# Patient Record
Sex: MALE | Race: BLACK OR AFRICAN AMERICAN | NOT HISPANIC OR LATINO | Employment: UNEMPLOYED | URBAN - METROPOLITAN AREA
[De-identification: names, ages, dates, MRNs, and addresses within clinical notes are randomized per-mention and may not be internally consistent; named-entity substitution may affect disease eponyms.]

---

## 2019-08-29 ENCOUNTER — HOSPITAL ENCOUNTER (OUTPATIENT)
Dept: RADIOLOGY | Facility: HOSPITAL | Age: 29
Discharge: HOME | End: 2019-08-29
Attending: PHYSICAL MEDICINE & REHABILITATION
Payer: MEDICAID

## 2019-08-29 DIAGNOSIS — M54.50 LOW BACK PAIN: ICD-10-CM

## 2019-08-29 DIAGNOSIS — M54.50 LOW BACK PAIN: Primary | ICD-10-CM

## 2019-08-29 PROCEDURE — 72100 X-RAY EXAM L-S SPINE 2/3 VWS: CPT | Mod: TC,FY,PO

## 2019-08-29 PROCEDURE — 73502 X-RAY EXAM HIP UNI 2-3 VIEWS: CPT | Mod: TC,FY,PO,RT

## 2019-10-04 DIAGNOSIS — M70.61 TROCHANTERIC BURSITIS OF RIGHT HIP: ICD-10-CM

## 2019-10-04 DIAGNOSIS — M53.3 SACROCOCCYGEAL DISORDERS, NOT ELSEWHERE CLASSIFIED: ICD-10-CM

## 2019-10-04 DIAGNOSIS — M16.51 UNILATERAL POST-TRAUMATIC OSTEOARTHRITIS, RIGHT HIP: Primary | ICD-10-CM

## 2019-10-15 ENCOUNTER — CLINICAL SUPPORT (OUTPATIENT)
Dept: REHABILITATION | Facility: HOSPITAL | Age: 29
End: 2019-10-15
Attending: PHYSICAL MEDICINE & REHABILITATION
Payer: MEDICAID

## 2019-10-15 DIAGNOSIS — G89.29 CHRONIC RIGHT-SIDED LOW BACK PAIN WITHOUT SCIATICA: ICD-10-CM

## 2019-10-15 DIAGNOSIS — M25.551 PAIN IN RIGHT HIP: ICD-10-CM

## 2019-10-15 DIAGNOSIS — M54.50 CHRONIC RIGHT-SIDED LOW BACK PAIN WITHOUT SCIATICA: ICD-10-CM

## 2019-10-15 DIAGNOSIS — M25.661 DECREASED RANGE OF MOTION OF RIGHT LOWER EXTREMITY: ICD-10-CM

## 2019-10-15 PROBLEM — M25.669 DECREASED ROM OF LOWER EXTREMITY: Status: ACTIVE | Noted: 2019-10-15

## 2019-10-15 PROCEDURE — 97161 PT EVAL LOW COMPLEX 20 MIN: CPT | Mod: PO
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
## 2019-11-20 ENCOUNTER — CLINICAL SUPPORT (OUTPATIENT)
Dept: REHABILITATION | Facility: HOSPITAL | Age: 29
End: 2019-11-20
Attending: PHYSICAL MEDICINE & REHABILITATION
Payer: MEDICAID

## 2019-11-20 ENCOUNTER — DOCUMENTATION ONLY (OUTPATIENT)
Dept: REHABILITATION | Facility: HOSPITAL | Age: 29
End: 2019-11-20

## 2019-11-20 DIAGNOSIS — G89.29 CHRONIC RIGHT-SIDED LOW BACK PAIN WITHOUT SCIATICA: ICD-10-CM

## 2019-11-20 DIAGNOSIS — M54.50 CHRONIC RIGHT-SIDED LOW BACK PAIN WITHOUT SCIATICA: ICD-10-CM

## 2019-11-20 DIAGNOSIS — M25.551 PAIN IN RIGHT HIP: ICD-10-CM

## 2019-11-20 DIAGNOSIS — M25.661 DECREASED RANGE OF MOTION OF RIGHT LOWER EXTREMITY: ICD-10-CM

## 2019-11-20 PROCEDURE — 97110 THERAPEUTIC EXERCISES: CPT | Mod: PO

## 2019-11-20 NOTE — PROGRESS NOTES
Face to Face PTA Conference performed with Drew Leo, CAITLIN regarding patient's current status, overall progress, and plan of care    Face to Face PTA Conference performed with Erwin Neil PT regarding patient's current status, overall progress, and plan of care    Drew Leo  11/20/2019

## 2019-11-20 NOTE — PROGRESS NOTES
"  Physical Therapy Daily Treatment Note     Name: Edinson [facility] Number: [id number]    Therapy Diagnosis:   Encounter Diagnoses   Name Primary?    Chronic right-sided low back pain without sciatica     Pain in right hip     Decreased range of motion of right lower extremity      Physician: Heron Reid MD    Visit Date: 11/20/2019    Physician Orders: PT Eval and Treat   Medical Diagnosis from Referral: M16.51 (ICD-10-CM) - Unilateral post-traumatic osteoarthritis, right hip M53.3 (ICD-10-CM) - Sacrococcygeal disorders, not elsewhere classified M70.61 (ICD-10-CM) - Trochanteric bursitis of right hip   Evaluation Date: 10/15/2019  Authorization Period Expiration: 10/31/2019   Plan of Care Expiration: 12/15/2019  Visit # / Visits authorized: 1/ 16    Time In: 10:00am  Time Out: 10:55am  Total Billable Time: 55 minutes    Precautions: Standard    Subjective     Pt reports: pain along anterior aspect of right hip.  He was not compliant with home exercise program.  Response to previous treatment: decreased hip pain  Functional change: improved mobility during ADL's    Pain: 1/10  Location: right upper legs     Objective     Edinson received therapeutic exercises to develop strength, endurance, ROM, flexibility and core stabilization for 55 minutes including:    Date  11/20/19   VISIT 1/16       POC EXP. DATE 12/15/19   FACE-TO-FACE 12/20/19   FOTO 2/5       TABLE:    Hamstring stretch 10 x 10"   Piriformis stretch 5 x 10"   Hip flexor/quad stretch 2 x 30" w/strap   SKTC 5 x 10"   LTR 1 x 10   TA 10 x 5"   bridging 1 x 15   SLR 1 x 10   Hip abduction 1 x 15 GTB   Hip adduction 15 x 3" w/ball           SEATED:    LAQ 2 x 10 x 1#   Hamstring curl 2 x 10 GTB   Hip flexion 1 x 15 x 1#           STANDING:    SLS 2 x 30"   Step ups L1 2 x 10   Mini-squats 2 x 10   Resisted side stepping 8 x 10ft RTB   Hip ext 2 x 10 RTB   Hip abd 2 x 10 RTB       Initials MA         Home Exercises Provided and Patient Education " Provided     Education provided:   - perform stretching in comfortable range    Written Home Exercises Provided: Patient instructed to cont prior HEP.  Exercises were reviewed and Edinson was able to demonstrate them prior to the end of the session.  Edinson demonstrated good  understanding of the education provided.     See EMR under Patient Instructions for exercises provided prior visit.    Assessment     Edinson returns to therapy with complaints of decreased pain on this date.  He is progressing towards goals with addition of therapeutic exercises and will continue with exercise protocol with progression next visit.  Pt continues to have limited ROM of right hip and decreased core strength.  He was educated on importance of HEP to augment the clinical treatment plan.  Edinson is progressing well towards his goals.   Pt prognosis is Good.     Pt will continue to benefit from skilled outpatient physical therapy to address the deficits listed in the problem list box on initial evaluation, provide pt/family education and to maximize pt's level of independence in the home and community environment.     Pt's spiritual, cultural and educational needs considered and pt agreeable to plan of care and goals.     Anticipated barriers to physical therapy: none    Goals:   Short Term Goals: 4 weeks  1. Patient will be compliant with HEP to promote the independent management of current diagnosis. In Progress  2. Patient will increase lumbar forward bending to reach lower shin level for function of dressing. In Progress  3. Patient will improve right hip abduction to 10 degrees. In Progress  4. Patient will report a decrease in complaints of right hip pain to 5/10 during ADLs. In Progress        Long Term Goals:  8 weeks  1. Patient will improve core stabilization strength to Good to improve tolerance to normal house work activities for self care independence. In Progress  2. Patient will increase lumbar forward bending to  reach ankle level for function of dressing. In Progress  3. Patient will report a decrease in complaints of right hip pain to 3/10 during ADLs. In Progress  4. Patient will improve FOTO limitation status from 52% to 43% placing the patient in the 40-60% impaired, limited, or restricted category indicating increased functional mobility. In Progress    Plan     Progress table exercises next visit.     Erwin Neli, PT

## 2019-11-27 ENCOUNTER — CLINICAL SUPPORT (OUTPATIENT)
Dept: REHABILITATION | Facility: HOSPITAL | Age: 29
End: 2019-11-27
Attending: PHYSICAL MEDICINE & REHABILITATION
Payer: MEDICAID

## 2019-11-27 DIAGNOSIS — G89.29 CHRONIC RIGHT-SIDED LOW BACK PAIN WITHOUT SCIATICA: ICD-10-CM

## 2019-11-27 DIAGNOSIS — M25.661 DECREASED RANGE OF MOTION OF RIGHT LOWER EXTREMITY: ICD-10-CM

## 2019-11-27 DIAGNOSIS — M25.551 PAIN IN RIGHT HIP: ICD-10-CM

## 2019-11-27 DIAGNOSIS — M54.50 CHRONIC RIGHT-SIDED LOW BACK PAIN WITHOUT SCIATICA: ICD-10-CM

## 2019-11-27 PROCEDURE — 97110 THERAPEUTIC EXERCISES: CPT | Mod: PO

## 2019-11-27 NOTE — PROGRESS NOTES
"  Physical Therapy Daily Treatment Note     Name: Edinson [facility] Number: [id number]    Therapy Diagnosis:   Encounter Diagnoses   Name Primary?    Chronic right-sided low back pain without sciatica     Pain in right hip     Decreased range of motion of right lower extremity      Physician: Heron Reid MD    Visit Date: 11/27/2019    Physician Orders: PT Eval and Treat   Medical Diagnosis from Referral: M16.51 (ICD-10-CM) - Unilateral post-traumatic osteoarthritis, right hip M53.3 (ICD-10-CM) - Sacrococcygeal disorders, not elsewhere classified M70.61 (ICD-10-CM) - Trochanteric bursitis of right hip   Evaluation Date: 10/15/2019  Authorization Period Expiration: 10/31/2019   Plan of Care Expiration: 12/15/2019  Visit # / Visits authorized: 2/ 16    Time In: 10:00 am  Time Out: 10:50 am  Total Billable Time: 40 minutes    Precautions: Standard    Subjective     Pt reports: his right leg is bothering him today   He was not compliant with home exercise program.  Response to previous treatment: decreased hip pain  Functional change: improved mobility during ADL's    Pain: 6/10  Location: right upper legs     Objective     Edinson received therapeutic exercises to develop strength, endurance, ROM, flexibility and core stabilization for 38 minutes including:    Date  11/27/19 11/20/19   VISIT 2/16 1/16        POC EXP. DATE 12/15/19 12/15/19   FACE-TO-FACE 12/20/19 12/20/19   FOTO 3/5 2/5        TABLE:     Hamstring stretch 10 x 10" 10 x 10"   Piriformis stretch 5 x 10" 5 x 10"   Hip flexor/quad stretch NT 2 x 30" w/strap   SKTC 5 x 10" 5 x 10"   LTR 1 x 10  1 x 10   TA 10 x 5" 10 x 5"   bridging 1 x 15 1 x 15   SLR 1 x 15  1 x 10   Hip abduction 1 x 15 GTB 1 x 15 GTB   Hip adduction 15 x 3" w/ball  15 x 3" w/ball             SEATED:     LAQ 2 x 10 x 1#  2 x 10 x 1#   Hamstring curl 2 x 10 GTB 2 x 10 GTB   Hip flexion 2 x 10 x 1# 1 x 15 x 1#             STANDING:     SLS 2 x 30" 2 x 30"   Step ups L1 2 x 10 "  L1 2 x 10   Mini-squats 2 x 10  2 x 10   Resisted side stepping NT 8 x 10ft RTB   Hip ext NT 2 x 10 RTB   Hip abd 2 x 10 RTB 2 x 10 RTB        Initials SANJEEV KIRKPATRICK         Home Exercises Provided and Patient Education Provided     Education provided:   - HEP compliance     Written Home Exercises Provided: Patient instructed to cont prior HEP.  Exercises were reviewed and Edinson was able to demonstrate them prior to the end of the session.  Edinson demonstrated good  understanding of the education provided.     See EMR under Patient Instructions for exercises provided prior visit.    Assessment     Edinson returns to therapy with complaints of increased pain on his right side.  He is progressing towards goals with addition of therapeutic exercises and will continue with exercise protocol with progression next visit. Patient required increased verbal cueing to focus and complete the task at hand.     Edinson is progressing well towards his goals.   Pt prognosis is Good.     Pt will continue to benefit from skilled outpatient physical therapy to address the deficits listed in the problem list box on initial evaluation, provide pt/family education and to maximize pt's level of independence in the home and community environment.     Pt's spiritual, cultural and educational needs considered and pt agreeable to plan of care and goals.     Anticipated barriers to physical therapy: none    Goals:   Short Term Goals: 4 weeks  1. Patient will be compliant with HEP to promote the independent management of current diagnosis. In Progress  2. Patient will increase lumbar forward bending to reach lower shin level for function of dressing. In Progress  3. Patient will improve right hip abduction to 10 degrees. In Progress  4. Patient will report a decrease in complaints of right hip pain to 5/10 during ADLs. In Progress        Long Term Goals:  8 weeks  1. Patient will improve core stabilization strength to Good to improve tolerance to  normal house work activities for self care independence. In Progress  2. Patient will increase lumbar forward bending to reach ankle level for function of dressing. In Progress  3. Patient will report a decrease in complaints of right hip pain to 3/10 during ADLs. In Progress  4. Patient will improve FOTO limitation status from 52% to 43% placing the patient in the 40-60% impaired, limited, or restricted category indicating increased functional mobility. In Progress    Plan     Progress table exercises next visit.     Gloria Bellamy, PT, DPT

## 2019-11-29 ENCOUNTER — CLINICAL SUPPORT (OUTPATIENT)
Dept: REHABILITATION | Facility: HOSPITAL | Age: 29
End: 2019-11-29
Attending: PHYSICAL MEDICINE & REHABILITATION
Payer: MEDICAID

## 2019-11-29 DIAGNOSIS — M25.661 DECREASED RANGE OF MOTION OF RIGHT LOWER EXTREMITY: ICD-10-CM

## 2019-11-29 DIAGNOSIS — M25.551 PAIN IN RIGHT HIP: ICD-10-CM

## 2019-11-29 DIAGNOSIS — M54.50 CHRONIC RIGHT-SIDED LOW BACK PAIN WITHOUT SCIATICA: ICD-10-CM

## 2019-11-29 DIAGNOSIS — G89.29 CHRONIC RIGHT-SIDED LOW BACK PAIN WITHOUT SCIATICA: ICD-10-CM

## 2019-11-29 PROCEDURE — 97110 THERAPEUTIC EXERCISES: CPT | Mod: PO

## 2019-11-29 NOTE — PROGRESS NOTES
"  Physical Therapy Daily Treatment Note     Name: Edinson [facility] Number: [id number]    Therapy Diagnosis:   Encounter Diagnoses   Name Primary?    Chronic right-sided low back pain without sciatica     Pain in right hip     Decreased range of motion of right lower extremity      Physician: Heron Reid MD    Visit Date: 11/29/2019    Physician Orders: PT Eval and Treat   Medical Diagnosis from Referral: M16.51 (ICD-10-CM) - Unilateral post-traumatic osteoarthritis, right hip M53.3 (ICD-10-CM) - Sacrococcygeal disorders, not elsewhere classified M70.61 (ICD-10-CM) - Trochanteric bursitis of right hip   Evaluation Date: 10/15/2019  Authorization Period Expiration: 10/31/2019   Plan of Care Expiration: 12/15/2019  Visit # / Visits authorized: 3/ 16    Time In: 10:20 am  Time Out: 11:00 am  Total Billable Time: 40 minutes    Precautions: Standard    Subjective     Pt reports: having decreased tightness in hip and leg since beginning therapy.  He was not compliant with home exercise program.  Response to previous treatment: decreased hip pain  Functional change: improved mobility during ADL's    Pain: 6/10  Location: right upper legs     Objective     Edinson received therapeutic exercises to develop strength, endurance, ROM, flexibility and core stabilization for 38 minutes including:    Date  11/29/19 11/27/19 11/20/19   VISIT 3/16 2/16 1/16         POC EXP. DATE 12/15/19 12/15/19 12/15/19   FACE-TO-FACE 12/20/19 12/20/19 12/20/19   FOTO 4/5 3/5 2/5         TABLE:      Hamstring stretch 10 x 10" 10 x 10" 10 x 10"   Piriformis stretch 5 x 10" 5 x 10" 5 x 10"   Hip flexor/quad stretch 3 x 20" w/strap NT 2 x 30" w/strap   SKTC 5 x 10" 5 x 10" 5 x 10"   LTR 1 x 10 1 x 10  1 x 10   TA -- 10 x 5" 10 x 5"   bridging 2 x 10 1 x 15 1 x 15   SLR 1 x 15 1 x 15  1 x 10   Hip abduction 2 x 10 GTB 1 x 15 GTB 1 x 15 GTB   Hip adduction 20 x 3" w/ball 15 x 3" w/ball  15 x 3" w/ball               SEATED:      LAQ 2 x 10 " "x 2# 2 x 10 x 1#  2 x 10 x 1#   Hamstring curl 2 x 10 GTB 2 x 10 GTB 2 x 10 GTB   Hip flexion 2 x 10 x 2# 2 x 10 x 1# 1 x 15 x 1#               STANDING:      SLS -- 2 x 30" 2 x 30"   Step ups -- L1 2 x 10  L1 2 x 10   Mini-squats -- 2 x 10  2 x 10   Resisted side stepping -- NT 8 x 10ft RTB   Hip ext -- NT 2 x 10 RTB   Hip abd -- 2 x 10 RTB 2 x 10 RTB         Initials MA SANJEEV KIRKPATRICK         Home Exercises Provided and Patient Education Provided     Education provided:   - HEP compliance     Written Home Exercises Provided: Patient instructed to cont prior HEP.  Exercises were reviewed and Edinson was able to demonstrate them prior to the end of the session.  Edinson demonstrated good  understanding of the education provided.     See EMR under Patient Instructions for exercises provided prior visit.    Assessment     Edinson continues to require constant supervision and cues to perform exercises and keep track of repetitions.  He continues to have tightness and weakness in bilateral LE's and will continue with current plan of care.      Edinson is progressing well towards his goals.   Pt prognosis is Good.     Pt will continue to benefit from skilled outpatient physical therapy to address the deficits listed in the problem list box on initial evaluation, provide pt/family education and to maximize pt's level of independence in the home and community environment.     Pt's spiritual, cultural and educational needs considered and pt agreeable to plan of care and goals.     Anticipated barriers to physical therapy: none    Goals:   Short Term Goals: 4 weeks  1. Patient will be compliant with HEP to promote the independent management of current diagnosis. In Progress  2. Patient will increase lumbar forward bending to reach lower shin level for function of dressing. In Progress  3. Patient will improve right hip abduction to 10 degrees. In Progress  4. Patient will report a decrease in complaints of right hip pain to 5/10 " during ADLs. In Progress        Long Term Goals:  8 weeks  1. Patient will improve core stabilization strength to Good to improve tolerance to normal house work activities for self care independence. In Progress  2. Patient will increase lumbar forward bending to reach ankle level for function of dressing. In Progress  3. Patient will report a decrease in complaints of right hip pain to 3/10 during ADLs. In Progress  4. Patient will improve FOTO limitation status from 52% to 43% placing the patient in the 40-60% impaired, limited, or restricted category indicating increased functional mobility. In Progress    Plan     Progress table exercises next visit.     Erwin Neil, PT, DPT

## 2019-12-12 ENCOUNTER — CLINICAL SUPPORT (OUTPATIENT)
Dept: REHABILITATION | Facility: HOSPITAL | Age: 29
End: 2019-12-12
Attending: PHYSICAL MEDICINE & REHABILITATION
Payer: MEDICAID

## 2019-12-12 ENCOUNTER — DOCUMENTATION ONLY (OUTPATIENT)
Dept: REHABILITATION | Facility: HOSPITAL | Age: 29
End: 2019-12-12

## 2019-12-12 DIAGNOSIS — M25.551 PAIN IN RIGHT HIP: ICD-10-CM

## 2019-12-12 DIAGNOSIS — M25.661 DECREASED RANGE OF MOTION OF RIGHT LOWER EXTREMITY: ICD-10-CM

## 2019-12-12 DIAGNOSIS — M54.50 CHRONIC RIGHT-SIDED LOW BACK PAIN WITHOUT SCIATICA: ICD-10-CM

## 2019-12-12 DIAGNOSIS — G89.29 CHRONIC RIGHT-SIDED LOW BACK PAIN WITHOUT SCIATICA: ICD-10-CM

## 2019-12-12 PROCEDURE — 97110 THERAPEUTIC EXERCISES: CPT | Mod: PO

## 2019-12-12 NOTE — PROGRESS NOTES
"  Physical Therapy Daily Treatment Note     Name: Edinson [facility] Number: [id number]    Therapy Diagnosis:   Encounter Diagnoses   Name Primary?    Chronic right-sided low back pain without sciatica     Pain in right hip     Decreased range of motion of right lower extremity      Physician: Heron Reid MD    Visit Date: 12/12/2019    Physician Orders: PT Eval and Treat   Medical Diagnosis from Referral: M16.51 (ICD-10-CM) - Unilateral post-traumatic osteoarthritis, right hip M53.3 (ICD-10-CM) - Sacrococcygeal disorders, not elsewhere classified M70.61 (ICD-10-CM) - Trochanteric bursitis of right hip   Evaluation Date: 10/15/2019  Authorization Period Expiration: 10/31/2019   Plan of Care Expiration: 12/15/2019  Visit # / Visits authorized: 4/ 16    Time In: 10:00 am  Time Out: 11:00 am  Total Billable Time: 40 minutes    Precautions: Standard    Subjective     Pt reports: decreased pain recently and has improved tolerance to walking.  He was not compliant with home exercise program.  Response to previous treatment: decreased hip pain  Functional change: improved mobility during ADL's    Pain: 1/10  Location: right upper legs     Objective     Edinson received therapeutic exercises to develop strength, endurance, ROM, flexibility and core stabilization for 38 minutes including:    Date  12/12/19 11/29/19 11/27/19 11/20/19   VISIT 4/16 3/16 2/16 1/16          POC EXP. DATE 1/12/20 12/15/19 12/15/19 12/15/19   FACE-TO-FACE 1/12/20 12/20/19 12/20/19 12/20/19   FOTO 5/5 4/5 3/5 2/5          TABLE:       Hamstring stretch 10 x 10" 10 x 10" 10 x 10" 10 x 10"   Piriformis stretch 5 x 10" 5 x 10" 5 x 10" 5 x 10"   Hip flexor/quad stretch DC 3 x 20" w/strap NT 2 x 30" w/strap   SKTC 5 x 10" 5 x 10" 5 x 10" 5 x 10"   LTR 1 x 10- 1 x 10 1 x 10  1 x 10   TA with march 1 x 15 -- 10 x 5" 10 x 5"   bridging 3 x 10 2 x 10 1 x 15 1 x 15   SLR 2 x 10 x 1# 1 x 15 1 x 15  1 x 10   Hip abduction 2 x 10 x 1# SL 2 x 10 GTB " "1 x 15 GTB 1 x 15 GTB   Hip adduction 30 x 3" w/ball 20 x 3" w/ball 15 x 3" w/ball  15 x 3" w/ball   Hip extension 1 x 10 x 1#             SEATED:       LAQ 3 x 10 x 3# 2 x 10 x 2# 2 x 10 x 1#  2 x 10 x 1#   Hamstring curl 3 x 10 BTB 2 x 10 GTB 2 x 10 GTB 2 x 10 GTB   Hip flexion 3 x 10 x 3# 2 x 10 x 2# 2 x 10 x 1# 1 x 15 x 1#                 STANDING:       SLS 2 x 30" -- 2 x 30" 2 x 30"   Step ups L1 2 x 10 -- L1 2 x 10  L1 2 x 10   Mini-squats 2 x 10 -- 2 x 10  2 x 10   Resisted side stepping -- -- NT 8 x 10ft RTB   Hip ext -- -- NT 2 x 10 RTB   Hip abd -- -- 2 x 10 RTB 2 x 10 RTB          Initials MA MA BJ MA      Functional limitation reporting disability percentage was obtained through use of FOTO Hip Survey indicating 43% disability     Lumbar Range of Motion:     Degrees Pain   Flexion Reaches to ankle    no         Extension Not tested    Not tested         Left Side Bending Reaches to knee no         Right Side Bending Reaches to knee no            Right Hip AROM/PROM as follows:  Flexion: 115/125 degrees  Abduction: 8/10 degrees  ER 25/35 degrees     Lower Extremity Strength  Right LE   Left LE     Knee extension: 4+/5 Knee extension: 4+/5   Knee flexion: 4+/5 Knee flexion: 4+/5   Hip flexion: 4/5 Hip flexion: 4+/5   Hip extension:  3+/5 Hip extension: 4/5   Hip abduction: 4-/5 Hip abduction: 4-/5   Hip adduction: 4-/5 Hip adduction 4/5          Home Exercises Provided and Patient Education Provided     Education provided:   - HEP compliance     Written Home Exercises Provided: Patient instructed to cont prior HEP.  Exercises were reviewed and Edinson was able to demonstrate them prior to the end of the session.  Edinson demonstrated good  understanding of the education provided.     See EMR under Patient Instructions for exercises provided prior visit.    Assessment     Edinson is noted to have improved hip ROM, improved LROM, and improved strength as compared to evaluation.  He is progressing towards " goals and would benefit from continued therapy in order to continued progression of strengthening and achieve long term goals.     Edinson is progressing well towards his goals.   Pt prognosis is Good.     Pt will continue to benefit from skilled outpatient physical therapy to address the deficits listed in the problem list box on initial evaluation, provide pt/family education and to maximize pt's level of independence in the home and community environment.     Pt's spiritual, cultural and educational needs considered and pt agreeable to plan of care and goals.     Anticipated barriers to physical therapy: none    Goals:   Short Term Goals: 4 weeks  1. Patient will be compliant with HEP to promote the independent management of current diagnosis. In Progress  2. Patient will increase lumbar forward bending to reach lower shin level for function of dressing. MET  3. Patient will improve right hip abduction to 10 degrees. In Progress  4. Patient will report a decrease in complaints of right hip pain to 5/10 during ADLs. MET        Long Term Goals:  8 weeks  1. Patient will improve core stabilization strength to Good to improve tolerance to normal house work activities for self care independence. In Progress  2. Patient will increase lumbar forward bending to reach ankle level for function of dressing. MET  3. Patient will report a decrease in complaints of right hip pain to 3/10 during ADLs. In Progress  4. Patient will improve FOTO limitation status from 52% to 43% placing the patient in the 40-60% impaired, limited, or restricted category indicating increased functional mobility. MET    Plan     Continue with plan of care and progress LE strengthening.    Erwin Neil, PT, DPT

## 2019-12-12 NOTE — PLAN OF CARE
Outpatient Therapy Updated Plan of Care     Visit Date: 12/12/2019  [id number]    Therapy Diagnosis:   Encounter Diagnoses   Name Primary?    Chronic right-sided low back pain without sciatica     Pain in right hip     Decreased range of motion of right lower extremity      Physician: Heron Reid MD    Physician Orders: PT Eval and Treat   Medical Diagnosis: M16.51 (ICD-10-CM) - Unilateral post-traumatic osteoarthritis, right hip M53.3 (ICD-10-CM) - Sacrococcygeal disorders, not elsewhere classified M70.61 (ICD-10-CM) - Trochanteric bursitis of right hip   Evaluation Date: 10/15/2019    Total Visits Received: 5  Cancelled Visits: 3  No Show Visits: 3    Current Certification Period:  10/15/19 to 12/15/19  Precautions:  standard  Visits from Evaluation Date:  4  Functional Level Prior to Evaluation:  independent    Subjective     Update: Pt reports having decreased pain, improved mobility, and strength since beginning therapy.  He has improved tolerance to prolonged walking.    Objective     Update:    Functional limitation reporting disability percentage was obtained through use of FOTO Hip Survey indicating 43% disability      Lumbar Range of Motion:     Degrees Pain   Flexion Reaches to ankle    no         Extension Not tested    Not tested         Left Side Bending Reaches to knee no         Right Side Bending Reaches to knee no            Right Hip AROM/PROM as follows:  Flexion: 115/125 degrees  Abduction: 8/10 degrees  ER 25/35 degrees     Lower Extremity Strength  Right LE   Left LE     Knee extension: 4+/5 Knee extension: 4+/5   Knee flexion: 4+/5 Knee flexion: 4+/5   Hip flexion: 4/5 Hip flexion: 4+/5   Hip extension:  3+/5 Hip extension: 4/5   Hip abduction: 4-/5 Hip abduction: 4-/5   Hip adduction: 4-/5 Hip adduction 4/5          Assessment     Update: Edinson is noted to have improved hip ROM, improved LROM, and improved strength as compared to evaluation.   He is progressing towards goals and would benefit from continued therapy in order to continued progression of strengthening and achieve long term goals.    Previous Short Term Goals Status:     Short Term Goals: 4 weeks  1. Patient will be compliant with HEP to promote the independent management of current diagnosis. In Progress  2. Patient will increase lumbar forward bending to reach lower shin level for function of dressing. MET  3. Patient will improve right hip abduction to 10 degrees. In Progress  4. Patient will report a decrease in complaints of right hip pain to 5/10 during ADLs. MET        Long Term Goals:  8 weeks  1. Patient will improve core stabilization strength to Good to improve tolerance to normal house work activities for self care independence. In Progress  2. Patient will increase lumbar forward bending to reach ankle level for function of dressing. MET  3. Patient will report a decrease in complaints of right hip pain to 3/10 during ADLs. In Progress  4. Patient will improve FOTO limitation status from 52% to 43% placing the patient in the 40-60% impaired, limited, or restricted category indicating increased functional mobility. MET    Long Term Goal Status:   continue per initial plan of care.  Reasons for Recertification of Therapy:   Pt has missed a few visits due to transportation issues and his plan of care is coming to an end.  He has been progressing towards goals and would benefit from continued therapy in order to achieve LTG.     Plan     Updated Certification Period: 12/12/2019 to 01/12/2020  Recommended Treatment Plan: 2 times per week for 4 weeks: Manual Therapy, Moist Heat/ Ice, Neuromuscular Re-ed, Patient Education, Therapeutic Activites and Therapeutic Exercise  Other Recommendations: none    Erwin Neil, PT  12/12/2019      I CERTIFY THE NEED FOR THESE SERVICES FURNISHED UNDER THIS PLAN OF TREATMENT AND WHILE UNDER MY CARE    Physician's comments:        Physician's  Signature: ___________________________________________________

## 2019-12-12 NOTE — PROGRESS NOTES
Face to Face PTA Conference performed with Drew Leo, CAITLIN regarding patient's current status, overall progress, and plan of care    Face to Face PTA Conference performed with Erwin Neil PT regarding patient's current status, overall progress, and plan of care    Drew Leo  12/12/2019

## 2020-01-21 PROBLEM — M25.669 DECREASED ROM OF LOWER EXTREMITY: Status: RESOLVED | Noted: 2019-10-15 | Resolved: 2020-01-21

## 2020-01-21 PROBLEM — M54.50 CHRONIC RIGHT-SIDED LOW BACK PAIN WITHOUT SCIATICA: Status: RESOLVED | Noted: 2019-10-15 | Resolved: 2020-01-21

## 2020-01-21 PROBLEM — M25.551 PAIN IN RIGHT HIP: Status: RESOLVED | Noted: 2019-10-15 | Resolved: 2020-01-21

## 2020-01-21 PROBLEM — G89.29 CHRONIC RIGHT-SIDED LOW BACK PAIN WITHOUT SCIATICA: Status: RESOLVED | Noted: 2019-10-15 | Resolved: 2020-01-21

## 2020-09-01 ENCOUNTER — HOSPITAL ENCOUNTER (EMERGENCY)
Facility: HOSPITAL | Age: 30
Discharge: HOME | End: 2020-09-01
Attending: EMERGENCY MEDICINE
Payer: MEDICAID

## 2020-09-01 VITALS
WEIGHT: 155 LBS | RESPIRATION RATE: 16 BRPM | DIASTOLIC BLOOD PRESSURE: 74 MMHG | SYSTOLIC BLOOD PRESSURE: 121 MMHG | HEIGHT: 70 IN | TEMPERATURE: 98 F | HEART RATE: 84 BPM | BODY MASS INDEX: 22.19 KG/M2 | OXYGEN SATURATION: 99 %

## 2020-09-01 DIAGNOSIS — F10.929 ALCOHOLIC INTOXICATION WITH COMPLICATION: ICD-10-CM

## 2020-09-01 DIAGNOSIS — F19.10 DRUG ABUSE: Primary | ICD-10-CM

## 2020-09-01 LAB
ALBUMIN SERPL BCP-MCNC: 4.5 G/DL (ref 3.5–5.2)
ALP SERPL-CCNC: 64 U/L (ref 38–126)
ALT SERPL W/O P-5'-P-CCNC: 9 U/L (ref 10–44)
AMPHET+METHAMPHET UR QL: NORMAL
ANION GAP SERPL CALC-SCNC: 9 MMOL/L (ref 8–16)
AST SERPL-CCNC: 31 U/L (ref 15–46)
BARBITURATES UR QL SCN>200 NG/ML: NEGATIVE
BASOPHILS # BLD AUTO: 0.08 K/UL (ref 0–0.2)
BASOPHILS NFR BLD: 1.6 % (ref 0–1.9)
BENZODIAZ UR QL SCN>200 NG/ML: NORMAL
BILIRUB SERPL-MCNC: 0.5 MG/DL (ref 0.1–1)
BUN SERPL-MCNC: 7 MG/DL (ref 2–20)
BZE UR QL SCN: NEGATIVE
CALCIUM SERPL-MCNC: 8.7 MG/DL (ref 8.7–10.5)
CANNABINOIDS UR QL SCN: NORMAL
CHLORIDE SERPL-SCNC: 102 MMOL/L (ref 95–110)
CO2 SERPL-SCNC: 29 MMOL/L (ref 23–29)
CREAT SERPL-MCNC: 0.64 MG/DL (ref 0.5–1.4)
CREAT UR-MCNC: >346.5 MG/DL (ref 23–375)
DIFFERENTIAL METHOD: ABNORMAL
EOSINOPHIL # BLD AUTO: 0.1 K/UL (ref 0–0.5)
EOSINOPHIL NFR BLD: 2 % (ref 0–8)
ERYTHROCYTE [DISTWIDTH] IN BLOOD BY AUTOMATED COUNT: 13.7 % (ref 11.5–14.5)
EST. GFR  (AFRICAN AMERICAN): >60 ML/MIN/1.73 M^2
EST. GFR  (NON AFRICAN AMERICAN): >60 ML/MIN/1.73 M^2
ETHANOL SERPL-MCNC: 41 MG/DL
GLUCOSE SERPL-MCNC: 84 MG/DL (ref 70–110)
HCT VFR BLD AUTO: 40.8 % (ref 40–54)
HGB BLD-MCNC: 13.4 G/DL (ref 14–18)
IMM GRANULOCYTES # BLD AUTO: 0.01 K/UL (ref 0–0.04)
IMM GRANULOCYTES NFR BLD AUTO: 0.2 % (ref 0–0.5)
LYMPHOCYTES # BLD AUTO: 1.6 K/UL (ref 1–4.8)
LYMPHOCYTES NFR BLD: 32.8 % (ref 18–48)
MCH RBC QN AUTO: 30.2 PG (ref 27–31)
MCHC RBC AUTO-ENTMCNC: 32.8 G/DL (ref 32–36)
MCV RBC AUTO: 92 FL (ref 82–98)
METHADONE UR QL SCN>300 NG/ML: NEGATIVE
MONOCYTES # BLD AUTO: 0.5 K/UL (ref 0.3–1)
MONOCYTES NFR BLD: 10.4 % (ref 4–15)
NEUTROPHILS # BLD AUTO: 2.6 K/UL (ref 1.8–7.7)
NEUTROPHILS NFR BLD: 53 % (ref 38–73)
NRBC BLD-RTO: 0 /100 WBC
OPIATES UR QL SCN: NEGATIVE
PCP UR QL SCN>25 NG/ML: NEGATIVE
PLATELET # BLD AUTO: 240 K/UL (ref 150–350)
PMV BLD AUTO: 10.6 FL (ref 9.2–12.9)
POTASSIUM SERPL-SCNC: 3.4 MMOL/L (ref 3.5–5.1)
PROT SERPL-MCNC: 7.4 G/DL (ref 6–8.4)
RBC # BLD AUTO: 4.43 M/UL (ref 4.6–6.2)
SODIUM SERPL-SCNC: 140 MMOL/L (ref 136–145)
TOXICOLOGY INFORMATION: NORMAL
WBC # BLD AUTO: 4.91 K/UL (ref 3.9–12.7)

## 2020-09-01 PROCEDURE — 80307 DRUG TEST PRSMV CHEM ANLYZR: CPT | Mod: ER

## 2020-09-01 PROCEDURE — 96360 HYDRATION IV INFUSION INIT: CPT | Mod: ER

## 2020-09-01 PROCEDURE — 85025 COMPLETE CBC W/AUTO DIFF WBC: CPT | Mod: ER

## 2020-09-01 PROCEDURE — 25000003 PHARM REV CODE 250: Mod: ER | Performed by: EMERGENCY MEDICINE

## 2020-09-01 PROCEDURE — 80320 DRUG SCREEN QUANTALCOHOLS: CPT | Mod: ER

## 2020-09-01 PROCEDURE — 80053 COMPREHEN METABOLIC PANEL: CPT | Mod: ER

## 2020-09-01 PROCEDURE — 99284 EMERGENCY DEPT VISIT MOD MDM: CPT | Mod: 25,ER

## 2020-09-01 RX ADMIN — SODIUM CHLORIDE 1000 ML: 0.9 INJECTION, SOLUTION INTRAVENOUS at 12:09

## 2020-09-01 NOTE — DISCHARGE INSTRUCTIONS
Please avoid all illicit drugs as well as any medicine not prescribed to you.  Avoid alcohol.  Please call alcoholics anonymous.  Please return to the ED immediately if symptoms return, change or worsen in any way.  Please call your primary doctor today for reevaluation appointment.

## 2020-09-01 NOTE — ED NOTES
Pt placed into gown.  Bed placed in lowest position with side rails up x 2. Call light is within reach of pt . Explanation of care provided to pt. Alarms set on monitor for heart rate, pulse ox, Blood Pressure. Plan of Care explained to pt, will continue to observe, monitor, and keep patient informed.     LOC: The patient is awake, alert and aware of environment with an appropriate affect, the patient is oriented x 3 and speaking appropriately.     Psych: Patient is calm and cooperative with good eye contact.    APPEARANCE: Patient is clean and non toxic appearing    NEUROLOGIC:  JERRI, Follows commands without difficulty. Speech is clear. No neuro deficits observed.    HEENT: Denies HEENT complaint or injury, moist mucus membranes     RESPIRATORY: Airway is open and patent, respirations are spontaneous; patient has a normal effort and rate. Bilateral breath sounds are clear. Pink nailbeds.     CARDIAC: Patient has a normal rate and rhythm, no peripheral edema noted, capillary refill < 2 seconds. PULSES are symmetrical in all extremities    GI/ : Soft and non tender to palpation, no distention noted.     MUSCULOSKELETAL:  Normal range of motion noted. Moves all extremities well, No swelling, deformity or tenderness noted. Pain to right hip, chronic     SKIN: The skin is warm, dry and intact. Patient has normal skin turgor and moist mucus membranes, no rashes or lesions. No Breakdown noted.

## 2020-09-01 NOTE — ED PROVIDER NOTES
Chief Complaint  Chief Complaint   Patient presents with    Fatigue     Brought in by EMS, patient was found in someones yard, EMS states patient was c/o right hip pain x a few days, admits to smoking mojo and ETOH        HPI  Edinson Hampton is a 30 y.o. male who presents with sleeping in someone's yd.  EMS reports they found him sleeping in someone's yd.  He admitted to smoking Mojo and drinking alcohol.  Patient complains also of right hip pain, initially he reports for a few days, but later reports that this is been ongoing for a long period of time.  He denies any new trauma or different character of pain in his usual right sided pain.  He reports that he is homeless and has no home at this time.  He asks for food and drink    Past medical history  Past Medical History:   Diagnosis Date    Trauma        Current Medications  No current facility-administered medications for this encounter.   No current outpatient medications on file.    Allergies  Review of patient's allergies indicates:  No Known Allergies    Surgical history  History reviewed. No pertinent surgical history.    Social history  Social History     Socioeconomic History    Marital status: Single     Spouse name: Not on file    Number of children: Not on file    Years of education: Not on file    Highest education level: Not on file   Occupational History    Not on file   Social Needs    Financial resource strain: Not on file    Food insecurity     Worry: Not on file     Inability: Not on file    Transportation needs     Medical: Not on file     Non-medical: Not on file   Tobacco Use    Smoking status: Never Smoker   Substance and Sexual Activity    Alcohol use: Yes    Drug use: Yes     Comment: mojo    Sexual activity: Not on file   Lifestyle    Physical activity     Days per week: Not on file     Minutes per session: Not on file    Stress: Not on file   Relationships    Social connections     Talks on phone: Not on file     Gets  "together: Not on file     Attends [employer] service: Not on file     Active member of club or organization: Not on file     Attends meetings of clubs or organizations: Not on file     Relationship status: Not on file   Other Topics Concern    Not on file   Social History Narrative    Not on file       Family History  History reviewed. No pertinent family history.    Review of systems  : No dysuria or discharge.  Musculoskeletal: No injury; full range of motion.  Skin: No rash, abscess, or laceration.  Neurologic: No new focal weakness or sensory changes.  All systems otherwise negative except as noted in ROS and HPI    Physical Exam  Vital signs: /74   Pulse 84   Temp 98.4 °F (36.9 °C)   Resp 16   Ht 5' 10" (1.778 m)   Wt 70.3 kg (155 lb)   SpO2 99%   BMI 22.24 kg/m²   Constitutional: No acute distress.  Well developed, alert, oriented and appropriate.  HENT: Normocephalic, atraumatic. Normal ear, nose, and throat.  Eyes:  Right eye strabismus  Neck: Normal range of motion, no tenderness; supple.  Respiratory: Nonlabored breathing with normal breath sounds.  Cardiovascular: RRR with no pulse deficit.  GI: Soft, nontender, no rebound or guarding.  Musculoskeletal: Normal ROM, no tenderness, injury, or edema.  Skin: Warm, dry skin without infection or injury.  Neurologic: Normal motor, sensation with no new focal deficit.  Drowsy intoxicated speech initially.  Walks with antalgic gait  Psychiatric: Affect normal, judgement normal, mood normal.  No SI, HI, and not gravely disabled.    Labs  Pertinent labs reviewed (see chart for details)  Labs Reviewed   ALCOHOL,MEDICAL (ETHANOL) - Abnormal; Notable for the following components:       Result Value    Alcohol, Medical, Serum 41 (*)     All other components within normal limits   CBC W/ AUTO DIFFERENTIAL - Abnormal; Notable for the following components:    RBC 4.43 (*)     Hemoglobin 13.4 (*)     All other components within normal limits   COMPREHENSIVE " METABOLIC PANEL - Abnormal; Notable for the following components:    Potassium 3.4 (*)     ALT 9 (*)     All other components within normal limits   DRUG SCREEN PANEL, URINE EMERGENCY    Narrative:     Specimen Source->Urine       ECG    ECG interpreted by ED MD    Radiology  No orders to display       Procedures  Procedures    Medications   sodium chloride 0.9% bolus 1,000 mL (0 mLs Intravenous Stopped 9/1/20 1325)       ED course and medical decision making         Patient remained stable throughout the ER visit.  He continued to improve and is no longer clinically intoxicated.  He has good gait and speech.  At discharge, he asks if we can send him somewhere to sleep.  We discussed shelters and different resources.  The patient reports at that time that he wants to go somewhere, he does not have any home to go to.  The patient is not suicidal or homicidal and he is not gravely disabled.  I do not have the authority to pec this patient.  He does not meet criteria at this time.  He is encouraged to follow up with primary doctor in given a list of resources.  He is also encouraged to return to the emergency department if he ever needs to or if he has symptoms.  He is encouraged to avoid alcohol and drugs    Disposition    Patient discharged in stable condition      Final impression  1. Drug abuse    2. Alcoholic intoxication with complication        Critical care time spent with this patient was 0 minutes excluding the procedure time.          Shaq Eugene MD  09/01/20 0640

## 2020-12-26 ENCOUNTER — HOSPITAL ENCOUNTER (EMERGENCY)
Facility: HOSPITAL | Age: 30
Discharge: PSYCHIATRIC OR SUBSTANCE USE FACILITY | End: 2020-12-26
Attending: EMERGENCY MEDICINE
Payer: MEDICAID

## 2020-12-26 VITALS
BODY MASS INDEX: 18.62 KG/M2 | SYSTOLIC BLOOD PRESSURE: 117 MMHG | OXYGEN SATURATION: 99 % | RESPIRATION RATE: 20 BRPM | HEART RATE: 65 BPM | TEMPERATURE: 98 F | DIASTOLIC BLOOD PRESSURE: 68 MMHG | WEIGHT: 133 LBS | HEIGHT: 71 IN

## 2020-12-26 DIAGNOSIS — R44.0 AUDITORY HALLUCINATION: Primary | ICD-10-CM

## 2020-12-26 DIAGNOSIS — Z91.148 NONCOMPLIANCE WITH MEDICATION REGIMEN: ICD-10-CM

## 2020-12-26 LAB
ALBUMIN SERPL BCP-MCNC: 4.5 G/DL (ref 3.5–5.2)
ALP SERPL-CCNC: 64 U/L (ref 38–126)
ALT SERPL W/O P-5'-P-CCNC: 9 U/L (ref 10–44)
AMPHET+METHAMPHET UR QL: NEGATIVE
ANION GAP SERPL CALC-SCNC: 8 MMOL/L (ref 8–16)
APAP SERPL-MCNC: <10 UG/ML (ref 10–20)
AST SERPL-CCNC: 22 U/L (ref 15–46)
BARBITURATES UR QL SCN>200 NG/ML: NEGATIVE
BASOPHILS # BLD AUTO: 0.07 K/UL (ref 0–0.2)
BASOPHILS NFR BLD: 1.2 % (ref 0–1.9)
BENZODIAZ UR QL SCN>200 NG/ML: NEGATIVE
BILIRUB SERPL-MCNC: 0.5 MG/DL (ref 0.1–1)
BILIRUB UR QL STRIP: NEGATIVE
BZE UR QL SCN: NEGATIVE
CALCIUM SERPL-MCNC: 9.6 MG/DL (ref 8.7–10.5)
CANNABINOIDS UR QL SCN: NORMAL
CHLORIDE SERPL-SCNC: 103 MMOL/L (ref 95–110)
CLARITY UR REFRACT.AUTO: CLEAR
CO2 SERPL-SCNC: 31 MMOL/L (ref 23–29)
COLOR UR AUTO: YELLOW
CREAT SERPL-MCNC: 0.92 MG/DL (ref 0.5–1.4)
CREAT UR-MCNC: 268.1 MG/DL (ref 23–375)
DIFFERENTIAL METHOD: ABNORMAL
EOSINOPHIL # BLD AUTO: 0.1 K/UL (ref 0–0.5)
EOSINOPHIL NFR BLD: 1.5 % (ref 0–8)
ERYTHROCYTE [DISTWIDTH] IN BLOOD BY AUTOMATED COUNT: 12.9 % (ref 11.5–14.5)
EST. GFR  (AFRICAN AMERICAN): >60 ML/MIN/1.73 M^2
EST. GFR  (NON AFRICAN AMERICAN): >60 ML/MIN/1.73 M^2
ETHANOL SERPL-MCNC: <10 MG/DL
GLUCOSE SERPL-MCNC: 94 MG/DL (ref 70–110)
GLUCOSE UR QL STRIP: NEGATIVE
HCT VFR BLD AUTO: 42.9 % (ref 40–54)
HGB BLD-MCNC: 14.3 G/DL (ref 14–18)
HGB UR QL STRIP: NEGATIVE
IMM GRANULOCYTES # BLD AUTO: 0.01 K/UL (ref 0–0.04)
IMM GRANULOCYTES NFR BLD AUTO: 0.2 % (ref 0–0.5)
KETONES UR QL STRIP: NEGATIVE
LEUKOCYTE ESTERASE UR QL STRIP: NEGATIVE
LYMPHOCYTES # BLD AUTO: 2.3 K/UL (ref 1–4.8)
LYMPHOCYTES NFR BLD: 40.1 % (ref 18–48)
MCH RBC QN AUTO: 31.2 PG (ref 27–31)
MCHC RBC AUTO-ENTMCNC: 33.3 G/DL (ref 32–36)
MCV RBC AUTO: 94 FL (ref 82–98)
METHADONE UR QL SCN>300 NG/ML: NEGATIVE
MONOCYTES # BLD AUTO: 0.4 K/UL (ref 0.3–1)
MONOCYTES NFR BLD: 6.5 % (ref 4–15)
NEUTROPHILS # BLD AUTO: 2.9 K/UL (ref 1.8–7.7)
NEUTROPHILS NFR BLD: 50.5 % (ref 38–73)
NITRITE UR QL STRIP: NEGATIVE
NRBC BLD-RTO: 0 /100 WBC
OPIATES UR QL SCN: NEGATIVE
PCP UR QL SCN>25 NG/ML: NEGATIVE
PH UR STRIP: 5 [PH] (ref 5–8)
PLATELET # BLD AUTO: 206 K/UL (ref 150–350)
PMV BLD AUTO: 10.8 FL (ref 9.2–12.9)
POTASSIUM SERPL-SCNC: 3.7 MMOL/L (ref 3.5–5.1)
PROT SERPL-MCNC: 7.7 G/DL (ref 6–8.4)
PROT UR QL STRIP: NEGATIVE
RBC # BLD AUTO: 4.58 M/UL (ref 4.6–6.2)
SARS-COV-2 RDRP RESP QL NAA+PROBE: NEGATIVE
SODIUM SERPL-SCNC: 142 MMOL/L (ref 136–145)
SP GR UR STRIP: 1.02 (ref 1–1.03)
TOXICOLOGY INFORMATION: NORMAL
URN SPEC COLLECT METH UR: NORMAL
UROBILINOGEN UR STRIP-ACNC: NEGATIVE EU/DL
UUN UR-MCNC: 15 MG/DL (ref 2–20)
WBC # BLD AUTO: 5.83 K/UL (ref 3.9–12.7)

## 2020-12-26 PROCEDURE — 85025 COMPLETE CBC W/AUTO DIFF WBC: CPT | Mod: ER

## 2020-12-26 PROCEDURE — 80329 ANALGESICS NON-OPIOID 1 OR 2: CPT | Mod: ER

## 2020-12-26 PROCEDURE — 81003 URINALYSIS AUTO W/O SCOPE: CPT | Mod: 59,ER

## 2020-12-26 PROCEDURE — 80307 DRUG TEST PRSMV CHEM ANLYZR: CPT | Mod: ER

## 2020-12-26 PROCEDURE — U0002 COVID-19 LAB TEST NON-CDC: HCPCS | Mod: ER

## 2020-12-26 PROCEDURE — 99285 EMERGENCY DEPT VISIT HI MDM: CPT | Mod: ER

## 2020-12-26 PROCEDURE — 80320 DRUG SCREEN QUANTALCOHOLS: CPT | Mod: ER

## 2020-12-26 PROCEDURE — 80053 COMPREHEN METABOLIC PANEL: CPT | Mod: ER

## 2020-12-27 PROBLEM — S06.9XAA TBI (TRAUMATIC BRAIN INJURY): Status: ACTIVE | Noted: 2020-12-27

## 2020-12-27 PROBLEM — R45.851 SUICIDE IDEATION: Status: ACTIVE | Noted: 2020-12-27

## 2020-12-27 PROBLEM — R44.0 AUDITORY HALLUCINATIONS: Status: ACTIVE | Noted: 2020-12-27

## 2020-12-27 PROBLEM — H50.9 STRABISMUS: Status: ACTIVE | Noted: 2020-12-27

## 2020-12-27 NOTE — ED NOTES
All patient belongings collected at time of arrival and inventoried and placed in a patient belongings bag and placed in hallway locker (patient has 1 bag):    1 pair of black high top tennis shoes  1 pair of multi-colored pajama pants  1 red hoodie jacket  1 pair of black pants  1 white t-shirt  1 black t-shirt  1 pair of black socks

## 2020-12-27 NOTE — ED NOTES
Bed: Exam 10  Expected date: 12/26/20  Expected time:   Means of arrival:   Comments:  EMS POSSIBLE PSYCH

## 2020-12-27 NOTE — ED NOTES
Pt resting comfortably with eyes closed.  Breathing even and non-labored.   NADN.  1:1 sitter outside of patient's door in view of patient.  Siderails up x2.  Bed in lowest/locked position.  WCM.

## 2020-12-27 NOTE — ED NOTES
Received telephone call from the transfer center, pt has been accepted at [facility] and the transfer center will set up transportation.

## 2020-12-27 NOTE — ED NOTES
Pt lying on stretcher with eyes closed.  Breathing even and non-labored.  Pt in no acute distress.   1:1 sitter outside of patient's door in view of patient.  Siderails up x2.  Bed in lowest/locked position.  WCM.

## 2020-12-27 NOTE — ED NOTES
Pt arrived to the ED via EMS c/o auditory hallucinations x3 days, he states the voices are telling him to do all kinds of evil things. He is suicidal and has a plan to run out in front of a moving car. He wants to check himself in before he does anything to himself or anyone else. Pt is currently not on any medications and states it has been many years since he has been on any meds. Pt states he has no place to stay at this time. Pt states he drinks alcohol occasionally and states the last time he drank alcohol was yesterday, uses marijuana daily, denies any other drug use.
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
## 2020-12-31 ENCOUNTER — HOSPITAL ENCOUNTER (EMERGENCY)
Facility: HOSPITAL | Age: 30
Discharge: HOME | End: 2020-12-31
Attending: EMERGENCY MEDICINE
Payer: MEDICAID

## 2020-12-31 VITALS
DIASTOLIC BLOOD PRESSURE: 70 MMHG | HEART RATE: 75 BPM | SYSTOLIC BLOOD PRESSURE: 116 MMHG | TEMPERATURE: 98 F | WEIGHT: 133 LBS | OXYGEN SATURATION: 100 % | BODY MASS INDEX: 18.62 KG/M2 | RESPIRATION RATE: 20 BRPM | HEIGHT: 71 IN

## 2020-12-31 DIAGNOSIS — Z00.8 ENCOUNTER FOR PSYCHOLOGICAL EVALUATION: Primary | ICD-10-CM

## 2020-12-31 DIAGNOSIS — R44.0 AUDITORY HALLUCINATIONS: ICD-10-CM

## 2020-12-31 LAB
ALBUMIN SERPL BCP-MCNC: 4.8 G/DL (ref 3.5–5.2)
ALP SERPL-CCNC: 75 U/L (ref 38–126)
ALT SERPL W/O P-5'-P-CCNC: 15 U/L (ref 10–44)
AMPHET+METHAMPHET UR QL: NEGATIVE
ANION GAP SERPL CALC-SCNC: 11 MMOL/L (ref 8–16)
APAP SERPL-MCNC: <10 UG/ML (ref 10–20)
AST SERPL-CCNC: 24 U/L (ref 15–46)
BARBITURATES UR QL SCN>200 NG/ML: NEGATIVE
BASOPHILS # BLD AUTO: 0.08 K/UL (ref 0–0.2)
BASOPHILS NFR BLD: 1.2 % (ref 0–1.9)
BENZODIAZ UR QL SCN>200 NG/ML: NEGATIVE
BILIRUB SERPL-MCNC: 0.4 MG/DL (ref 0.1–1)
BZE UR QL SCN: NEGATIVE
CALCIUM SERPL-MCNC: 9.4 MG/DL (ref 8.7–10.5)
CANNABINOIDS UR QL SCN: NORMAL
CHLORIDE SERPL-SCNC: 103 MMOL/L (ref 95–110)
CO2 SERPL-SCNC: 28 MMOL/L (ref 23–29)
CREAT SERPL-MCNC: 0.8 MG/DL (ref 0.5–1.4)
CREAT UR-MCNC: 132.7 MG/DL (ref 23–375)
DIFFERENTIAL METHOD: ABNORMAL
EOSINOPHIL # BLD AUTO: 0.1 K/UL (ref 0–0.5)
EOSINOPHIL NFR BLD: 2.1 % (ref 0–8)
ERYTHROCYTE [DISTWIDTH] IN BLOOD BY AUTOMATED COUNT: 12.9 % (ref 11.5–14.5)
EST. GFR  (AFRICAN AMERICAN): >60 ML/MIN/1.73 M^2
EST. GFR  (NON AFRICAN AMERICAN): >60 ML/MIN/1.73 M^2
ETHANOL SERPL-MCNC: <10 MG/DL
GLUCOSE SERPL-MCNC: 162 MG/DL (ref 70–110)
HCT VFR BLD AUTO: 44.6 % (ref 40–54)
HGB BLD-MCNC: 14.7 G/DL (ref 14–18)
IMM GRANULOCYTES # BLD AUTO: 0 K/UL (ref 0–0.04)
IMM GRANULOCYTES NFR BLD AUTO: 0 % (ref 0–0.5)
LYMPHOCYTES # BLD AUTO: 2.3 K/UL (ref 1–4.8)
LYMPHOCYTES NFR BLD: 33.6 % (ref 18–48)
MCH RBC QN AUTO: 31.1 PG (ref 27–31)
MCHC RBC AUTO-ENTMCNC: 33 G/DL (ref 32–36)
MCV RBC AUTO: 95 FL (ref 82–98)
METHADONE UR QL SCN>300 NG/ML: NEGATIVE
MONOCYTES # BLD AUTO: 0.6 K/UL (ref 0.3–1)
MONOCYTES NFR BLD: 9.1 % (ref 4–15)
NEUTROPHILS # BLD AUTO: 3.6 K/UL (ref 1.8–7.7)
NEUTROPHILS NFR BLD: 54 % (ref 38–73)
NRBC BLD-RTO: 0 /100 WBC
OPIATES UR QL SCN: NEGATIVE
PCP UR QL SCN>25 NG/ML: NEGATIVE
PLATELET # BLD AUTO: 231 K/UL (ref 150–350)
PMV BLD AUTO: 10.6 FL (ref 9.2–12.9)
POTASSIUM SERPL-SCNC: 3.9 MMOL/L (ref 3.5–5.1)
PROT SERPL-MCNC: 8 G/DL (ref 6–8.4)
RBC # BLD AUTO: 4.72 M/UL (ref 4.6–6.2)
SALICYLATES SERPL-MCNC: <5 MG/DL (ref 15–30)
SARS-COV-2 RDRP RESP QL NAA+PROBE: NEGATIVE
SODIUM SERPL-SCNC: 142 MMOL/L (ref 136–145)
TOXICOLOGY INFORMATION: NORMAL
TSH SERPL DL<=0.005 MIU/L-ACNC: 2.51 UIU/ML (ref 0.4–4)
UUN UR-MCNC: 13 MG/DL (ref 2–20)
WBC # BLD AUTO: 6.7 K/UL (ref 3.9–12.7)

## 2020-12-31 PROCEDURE — 85025 COMPLETE CBC W/AUTO DIFF WBC: CPT | Mod: ER

## 2020-12-31 PROCEDURE — 99284 EMERGENCY DEPT VISIT MOD MDM: CPT | Mod: 25,ER

## 2020-12-31 PROCEDURE — 25000003 PHARM REV CODE 250: Mod: ER | Performed by: EMERGENCY MEDICINE

## 2020-12-31 PROCEDURE — 84443 ASSAY THYROID STIM HORMONE: CPT | Mod: ER

## 2020-12-31 PROCEDURE — 80320 DRUG SCREEN QUANTALCOHOLS: CPT | Mod: ER

## 2020-12-31 PROCEDURE — 80329 ANALGESICS NON-OPIOID 1 OR 2: CPT | Mod: ER

## 2020-12-31 PROCEDURE — 93010 ELECTROCARDIOGRAM REPORT: CPT | Mod: ,,, | Performed by: INTERNAL MEDICINE

## 2020-12-31 PROCEDURE — 80053 COMPREHEN METABOLIC PANEL: CPT | Mod: ER

## 2020-12-31 PROCEDURE — U0002 COVID-19 LAB TEST NON-CDC: HCPCS | Mod: ER

## 2020-12-31 PROCEDURE — 80307 DRUG TEST PRSMV CHEM ANLYZR: CPT | Mod: ER

## 2020-12-31 PROCEDURE — 93010 EKG 12-LEAD: ICD-10-PCS | Mod: ,,, | Performed by: INTERNAL MEDICINE

## 2020-12-31 PROCEDURE — 93005 ELECTROCARDIOGRAM TRACING: CPT | Mod: ER

## 2020-12-31 RX ORDER — RISPERIDONE 1 MG/1
2 TABLET ORAL 2 TIMES DAILY
Qty: 120 TABLET | Refills: 2 | Status: ON HOLD | OUTPATIENT
Start: 2020-12-31 | End: 2021-03-01 | Stop reason: HOSPADM

## 2020-12-31 RX ORDER — DIVALPROEX SODIUM 250 MG/1
500 TABLET, DELAYED RELEASE ORAL
Status: COMPLETED | OUTPATIENT
Start: 2020-12-31 | End: 2020-12-31

## 2020-12-31 RX ORDER — DIVALPROEX SODIUM 500 MG/1
500 TABLET, DELAYED RELEASE ORAL EVERY 12 HOURS
Qty: 60 TABLET | Refills: 2 | Status: ON HOLD | OUTPATIENT
Start: 2020-12-31 | End: 2021-03-01 | Stop reason: HOSPADM

## 2020-12-31 RX ADMIN — DIVALPROEX SODIUM 500 MG: 250 TABLET, DELAYED RELEASE ORAL at 07:12

## 2021-02-11 ENCOUNTER — HOSPITAL ENCOUNTER (EMERGENCY)
Facility: HOSPITAL | Age: 31
Discharge: PSYCHIATRIC OR SUBSTANCE USE FACILITY | End: 2021-02-12
Attending: EMERGENCY MEDICINE
Payer: MEDICAID

## 2021-02-11 DIAGNOSIS — F32.A DEPRESSION WITH SUICIDAL IDEATION: Primary | ICD-10-CM

## 2021-02-11 DIAGNOSIS — R45.851 DEPRESSION WITH SUICIDAL IDEATION: Primary | ICD-10-CM

## 2021-02-11 LAB
BASOPHILS # BLD AUTO: 0.05 K/UL (ref 0–0.2)
BASOPHILS NFR BLD: 0.9 % (ref 0–1.9)
BILIRUB UR QL STRIP: NEGATIVE
CLARITY UR: CLEAR
COLOR UR: YELLOW
DIFFERENTIAL METHOD: ABNORMAL
EOSINOPHIL # BLD AUTO: 0.1 K/UL (ref 0–0.5)
EOSINOPHIL NFR BLD: 1.4 % (ref 0–8)
ERYTHROCYTE [DISTWIDTH] IN BLOOD BY AUTOMATED COUNT: 13.4 % (ref 11.5–14.5)
GLUCOSE UR QL STRIP: NEGATIVE
HCT VFR BLD AUTO: 40.2 % (ref 40–54)
HGB BLD-MCNC: 13.2 G/DL (ref 14–18)
HGB UR QL STRIP: NEGATIVE
IMM GRANULOCYTES # BLD AUTO: 0.01 K/UL (ref 0–0.04)
IMM GRANULOCYTES NFR BLD AUTO: 0.2 % (ref 0–0.5)
KETONES UR QL STRIP: NEGATIVE
LEUKOCYTE ESTERASE UR QL STRIP: NEGATIVE
LYMPHOCYTES # BLD AUTO: 2.3 K/UL (ref 1–4.8)
LYMPHOCYTES NFR BLD: 41.6 % (ref 18–48)
MCH RBC QN AUTO: 30.8 PG (ref 27–31)
MCHC RBC AUTO-ENTMCNC: 32.8 G/DL (ref 32–36)
MCV RBC AUTO: 94 FL (ref 82–98)
MONOCYTES # BLD AUTO: 0.4 K/UL (ref 0.3–1)
MONOCYTES NFR BLD: 7 % (ref 4–15)
NEUTROPHILS # BLD AUTO: 2.7 K/UL (ref 1.8–7.7)
NEUTROPHILS NFR BLD: 48.9 % (ref 38–73)
NITRITE UR QL STRIP: NEGATIVE
NRBC BLD-RTO: 0 /100 WBC
PH UR STRIP: 6 [PH] (ref 5–8)
PLATELET # BLD AUTO: 190 K/UL (ref 150–350)
PMV BLD AUTO: 11.3 FL (ref 9.2–12.9)
PROT UR QL STRIP: NEGATIVE
RBC # BLD AUTO: 4.28 M/UL (ref 4.6–6.2)
SP GR UR STRIP: 1.02 (ref 1–1.03)
URN SPEC COLLECT METH UR: NORMAL
UROBILINOGEN UR STRIP-ACNC: NEGATIVE EU/DL
WBC # BLD AUTO: 5.58 K/UL (ref 3.9–12.7)

## 2021-02-11 PROCEDURE — 82077 ASSAY SPEC XCP UR&BREATH IA: CPT

## 2021-02-11 PROCEDURE — 81003 URINALYSIS AUTO W/O SCOPE: CPT | Mod: 59

## 2021-02-11 PROCEDURE — 80143 DRUG ASSAY ACETAMINOPHEN: CPT

## 2021-02-11 PROCEDURE — 99285 EMERGENCY DEPT VISIT HI MDM: CPT

## 2021-02-11 PROCEDURE — 80053 COMPREHEN METABOLIC PANEL: CPT

## 2021-02-11 PROCEDURE — 85025 COMPLETE CBC W/AUTO DIFF WBC: CPT

## 2021-02-11 PROCEDURE — U0002 COVID-19 LAB TEST NON-CDC: HCPCS

## 2021-02-11 PROCEDURE — 80307 DRUG TEST PRSMV CHEM ANLYZR: CPT

## 2021-02-12 VITALS
SYSTOLIC BLOOD PRESSURE: 127 MMHG | OXYGEN SATURATION: 100 % | TEMPERATURE: 98 F | WEIGHT: 133 LBS | DIASTOLIC BLOOD PRESSURE: 75 MMHG | BODY MASS INDEX: 18.01 KG/M2 | RESPIRATION RATE: 16 BRPM | HEIGHT: 72 IN | HEART RATE: 50 BPM

## 2021-02-12 LAB
ALBUMIN SERPL BCP-MCNC: 4.3 G/DL (ref 3.5–5.2)
ALP SERPL-CCNC: 82 U/L (ref 55–135)
ALT SERPL W/O P-5'-P-CCNC: 6 U/L (ref 10–44)
AMPHET+METHAMPHET UR QL: NEGATIVE
ANION GAP SERPL CALC-SCNC: 9 MMOL/L (ref 8–16)
APAP SERPL-MCNC: <3 UG/ML (ref 10–20)
AST SERPL-CCNC: 15 U/L (ref 10–40)
BARBITURATES UR QL SCN>200 NG/ML: NEGATIVE
BENZODIAZ UR QL SCN>200 NG/ML: NEGATIVE
BILIRUB SERPL-MCNC: 0.4 MG/DL (ref 0.1–1)
BUN SERPL-MCNC: 10 MG/DL (ref 6–20)
BZE UR QL SCN: NEGATIVE
CALCIUM SERPL-MCNC: 8.7 MG/DL (ref 8.7–10.5)
CANNABINOIDS UR QL SCN: NEGATIVE
CHLORIDE SERPL-SCNC: 106 MMOL/L (ref 95–110)
CO2 SERPL-SCNC: 27 MMOL/L (ref 23–29)
CREAT SERPL-MCNC: 0.7 MG/DL (ref 0.5–1.4)
CREAT UR-MCNC: 143 MG/DL (ref 23–375)
EST. GFR  (AFRICAN AMERICAN): >60 ML/MIN/1.73 M^2
EST. GFR  (NON AFRICAN AMERICAN): >60 ML/MIN/1.73 M^2
ETHANOL SERPL-MCNC: <10 MG/DL
GLUCOSE SERPL-MCNC: 66 MG/DL (ref 70–110)
METHADONE UR QL SCN>300 NG/ML: NEGATIVE
OPIATES UR QL SCN: NEGATIVE
PCP UR QL SCN>25 NG/ML: NEGATIVE
POTASSIUM SERPL-SCNC: 3.7 MMOL/L (ref 3.5–5.1)
PROT SERPL-MCNC: 7.3 G/DL (ref 6–8.4)
SARS-COV-2 RDRP RESP QL NAA+PROBE: NEGATIVE
SODIUM SERPL-SCNC: 142 MMOL/L (ref 136–145)
TOXICOLOGY INFORMATION: NORMAL

## 2021-02-12 PROCEDURE — 25000003 PHARM REV CODE 250: Performed by: EMERGENCY MEDICINE

## 2021-02-12 RX ORDER — OLANZAPINE 5 MG/1
10 TABLET, ORALLY DISINTEGRATING ORAL NIGHTLY
Status: DISCONTINUED | OUTPATIENT
Start: 2021-02-12 | End: 2021-02-12 | Stop reason: HOSPADM

## 2021-02-12 RX ADMIN — OLANZAPINE 10 MG: 5 TABLET, ORALLY DISINTEGRATING ORAL at 02:02

## 2021-02-24 ENCOUNTER — HOSPITAL ENCOUNTER (EMERGENCY)
Facility: HOSPITAL | Age: 31
Discharge: PSYCHIATRIC OR SUBSTANCE USE FACILITY | End: 2021-02-24
Attending: EMERGENCY MEDICINE
Payer: MEDICAID

## 2021-02-24 VITALS
WEIGHT: 130 LBS | OXYGEN SATURATION: 97 % | TEMPERATURE: 98 F | DIASTOLIC BLOOD PRESSURE: 65 MMHG | RESPIRATION RATE: 20 BRPM | BODY MASS INDEX: 18.2 KG/M2 | HEIGHT: 71 IN | HEART RATE: 71 BPM | SYSTOLIC BLOOD PRESSURE: 126 MMHG

## 2021-02-24 DIAGNOSIS — Z00.8 MEDICAL CLEARANCE FOR PSYCHIATRIC ADMISSION: ICD-10-CM

## 2021-02-24 DIAGNOSIS — R45.851 SUICIDAL IDEATION: Primary | ICD-10-CM

## 2021-02-24 LAB
ALBUMIN SERPL BCP-MCNC: 4.8 G/DL (ref 3.5–5.2)
ALP SERPL-CCNC: 71 U/L (ref 38–126)
ALT SERPL W/O P-5'-P-CCNC: 10 U/L (ref 10–44)
AMPHET+METHAMPHET UR QL: NEGATIVE
ANION GAP SERPL CALC-SCNC: 8 MMOL/L (ref 8–16)
APAP SERPL-MCNC: <10 UG/ML (ref 10–20)
AST SERPL-CCNC: 24 U/L (ref 15–46)
BARBITURATES UR QL SCN>200 NG/ML: NEGATIVE
BASOPHILS # BLD AUTO: 0.06 K/UL (ref 0–0.2)
BASOPHILS NFR BLD: 1.2 % (ref 0–1.9)
BENZODIAZ UR QL SCN>200 NG/ML: NEGATIVE
BILIRUB SERPL-MCNC: 0.4 MG/DL (ref 0.1–1)
BILIRUB UR QL STRIP: NEGATIVE
BZE UR QL SCN: NEGATIVE
CALCIUM SERPL-MCNC: 9.7 MG/DL (ref 8.7–10.5)
CANNABINOIDS UR QL SCN: NEGATIVE
CHLORIDE SERPL-SCNC: 101 MMOL/L (ref 95–110)
CLARITY UR REFRACT.AUTO: CLEAR
CO2 SERPL-SCNC: 34 MMOL/L (ref 23–29)
COLOR UR AUTO: YELLOW
CREAT SERPL-MCNC: 0.83 MG/DL (ref 0.5–1.4)
CREAT UR-MCNC: 169 MG/DL (ref 23–375)
DIFFERENTIAL METHOD: ABNORMAL
EOSINOPHIL # BLD AUTO: 0.1 K/UL (ref 0–0.5)
EOSINOPHIL NFR BLD: 1.4 % (ref 0–8)
ERYTHROCYTE [DISTWIDTH] IN BLOOD BY AUTOMATED COUNT: 13.3 % (ref 11.5–14.5)
EST. GFR  (AFRICAN AMERICAN): >60 ML/MIN/1.73 M^2
EST. GFR  (NON AFRICAN AMERICAN): >60 ML/MIN/1.73 M^2
ETHANOL SERPL-MCNC: <10 MG/DL
GLUCOSE SERPL-MCNC: 103 MG/DL (ref 70–110)
GLUCOSE UR QL STRIP: NEGATIVE
HCT VFR BLD AUTO: 39.5 % (ref 40–54)
HGB BLD-MCNC: 12.9 G/DL (ref 14–18)
HGB UR QL STRIP: NEGATIVE
IMM GRANULOCYTES # BLD AUTO: 0.01 K/UL (ref 0–0.04)
IMM GRANULOCYTES NFR BLD AUTO: 0.2 % (ref 0–0.5)
KETONES UR QL STRIP: NEGATIVE
LEUKOCYTE ESTERASE UR QL STRIP: NEGATIVE
LYMPHOCYTES # BLD AUTO: 1.6 K/UL (ref 1–4.8)
LYMPHOCYTES NFR BLD: 30.7 % (ref 18–48)
MCH RBC QN AUTO: 30.6 PG (ref 27–31)
MCHC RBC AUTO-ENTMCNC: 32.7 G/DL (ref 32–36)
MCV RBC AUTO: 94 FL (ref 82–98)
METHADONE UR QL SCN>300 NG/ML: NEGATIVE
MONOCYTES # BLD AUTO: 0.5 K/UL (ref 0.3–1)
MONOCYTES NFR BLD: 9.1 % (ref 4–15)
NEUTROPHILS # BLD AUTO: 3 K/UL (ref 1.8–7.7)
NEUTROPHILS NFR BLD: 57.4 % (ref 38–73)
NITRITE UR QL STRIP: NEGATIVE
NRBC BLD-RTO: 0 /100 WBC
NT-PROBNP SERPL-MCNC: 24 PG/ML (ref 5–450)
OPIATES UR QL SCN: NEGATIVE
PCP UR QL SCN>25 NG/ML: NEGATIVE
PH UR STRIP: 7 [PH] (ref 5–8)
PLATELET # BLD AUTO: 178 K/UL (ref 150–350)
PMV BLD AUTO: 11.2 FL (ref 9.2–12.9)
POTASSIUM SERPL-SCNC: 3.8 MMOL/L (ref 3.5–5.1)
PROT SERPL-MCNC: 8.1 G/DL (ref 6–8.4)
PROT UR QL STRIP: NEGATIVE
RBC # BLD AUTO: 4.21 M/UL (ref 4.6–6.2)
SALICYLATES SERPL-MCNC: <5 MG/DL (ref 15–30)
SARS-COV-2 RDRP RESP QL NAA+PROBE: NEGATIVE
SODIUM SERPL-SCNC: 143 MMOL/L (ref 136–145)
SP GR UR STRIP: 1.01 (ref 1–1.03)
TOXICOLOGY INFORMATION: NORMAL
URN SPEC COLLECT METH UR: NORMAL
UROBILINOGEN UR STRIP-ACNC: NEGATIVE EU/DL
UUN UR-MCNC: 10 MG/DL (ref 2–20)
WBC # BLD AUTO: 5.18 K/UL (ref 3.9–12.7)

## 2021-02-24 PROCEDURE — 80179 DRUG ASSAY SALICYLATE: CPT | Mod: ER

## 2021-02-24 PROCEDURE — 93005 ELECTROCARDIOGRAM TRACING: CPT | Mod: ER

## 2021-02-24 PROCEDURE — 80143 DRUG ASSAY ACETAMINOPHEN: CPT | Mod: ER

## 2021-02-24 PROCEDURE — 93010 ELECTROCARDIOGRAM REPORT: CPT | Mod: ,,, | Performed by: INTERNAL MEDICINE

## 2021-02-24 PROCEDURE — 82077 ASSAY SPEC XCP UR&BREATH IA: CPT | Mod: ER

## 2021-02-24 PROCEDURE — 80053 COMPREHEN METABOLIC PANEL: CPT | Mod: ER

## 2021-02-24 PROCEDURE — 85025 COMPLETE CBC W/AUTO DIFF WBC: CPT | Mod: ER

## 2021-02-24 PROCEDURE — 25000003 PHARM REV CODE 250: Mod: ER | Performed by: EMERGENCY MEDICINE

## 2021-02-24 PROCEDURE — 93010 EKG 12-LEAD: ICD-10-PCS | Mod: ,,, | Performed by: INTERNAL MEDICINE

## 2021-02-24 PROCEDURE — 99285 EMERGENCY DEPT VISIT HI MDM: CPT | Mod: 25,ER

## 2021-02-24 PROCEDURE — 80307 DRUG TEST PRSMV CHEM ANLYZR: CPT | Mod: ER

## 2021-02-24 PROCEDURE — 81003 URINALYSIS AUTO W/O SCOPE: CPT | Mod: 59,ER

## 2021-02-24 PROCEDURE — U0002 COVID-19 LAB TEST NON-CDC: HCPCS | Mod: ER

## 2021-02-24 PROCEDURE — 83880 ASSAY OF NATRIURETIC PEPTIDE: CPT | Mod: ER

## 2021-02-24 RX ORDER — OLANZAPINE 5 MG/1
10 TABLET, ORALLY DISINTEGRATING ORAL
Status: COMPLETED | OUTPATIENT
Start: 2021-02-24 | End: 2021-02-24

## 2021-02-24 RX ADMIN — OLANZAPINE 10 MG: 5 TABLET, ORALLY DISINTEGRATING ORAL at 08:02

## 2021-02-25 PROBLEM — R45.851 SUICIDAL IDEATION: Status: ACTIVE | Noted: 2021-02-25

## 2021-03-01 PROBLEM — F32.A DEPRESSION: Status: ACTIVE | Noted: 2021-03-01

## 2021-03-01 PROBLEM — R44.0 AUDITORY HALLUCINATIONS: Status: RESOLVED | Noted: 2020-12-27 | Resolved: 2021-03-01

## 2021-03-01 PROBLEM — R45.851 SUICIDAL IDEATION: Status: RESOLVED | Noted: 2021-02-25 | Resolved: 2021-03-01

## 2021-03-02 PROBLEM — Z13.9 ENCOUNTER FOR MEDICAL SCREENING EXAMINATION: Status: ACTIVE | Noted: 2021-03-02

## 2021-03-02 PROBLEM — H54.61 VISION LOSS OF RIGHT EYE: Chronic | Status: ACTIVE | Noted: 2021-03-02

## 2021-03-27 ENCOUNTER — HOSPITAL ENCOUNTER (OUTPATIENT)
Facility: HOSPITAL | Age: 31
Discharge: HOME | End: 2021-03-28
Attending: EMERGENCY MEDICINE | Admitting: INTERNAL MEDICINE
Payer: MEDICAID

## 2021-03-27 DIAGNOSIS — E87.6 HYPOKALEMIA: ICD-10-CM

## 2021-03-27 DIAGNOSIS — T40.2X1A OPIOID OVERDOSE, ACCIDENTAL OR UNINTENTIONAL, INITIAL ENCOUNTER: ICD-10-CM

## 2021-03-27 DIAGNOSIS — J81.1 NON-CARDIOGENIC PULMONARY EDEMA: Primary | ICD-10-CM

## 2021-03-27 DIAGNOSIS — T50.901A DRUG OVERDOSE: ICD-10-CM

## 2021-03-27 DIAGNOSIS — T50.904A DRUG OVERDOSE, UNDETERMINED INTENT, INITIAL ENCOUNTER: ICD-10-CM

## 2021-03-27 LAB
ALBUMIN SERPL BCP-MCNC: 4.9 G/DL (ref 3.5–5.2)
ALP SERPL-CCNC: 71 U/L (ref 38–126)
ALT SERPL W/O P-5'-P-CCNC: 13 U/L (ref 10–44)
ANION GAP SERPL CALC-SCNC: 10 MMOL/L (ref 8–16)
APAP SERPL-MCNC: <10 UG/ML (ref 10–20)
AST SERPL-CCNC: 26 U/L (ref 15–46)
BASOPHILS # BLD AUTO: 0.08 K/UL (ref 0–0.2)
BASOPHILS NFR BLD: 0.8 % (ref 0–1.9)
BILIRUB SERPL-MCNC: 0.6 MG/DL (ref 0.1–1)
CALCIUM SERPL-MCNC: 9.1 MG/DL (ref 8.7–10.5)
CHLORIDE SERPL-SCNC: 103 MMOL/L (ref 95–110)
CK SERPL-CCNC: 102 U/L (ref 55–170)
CO2 SERPL-SCNC: 29 MMOL/L (ref 23–29)
CREAT SERPL-MCNC: 1.08 MG/DL (ref 0.5–1.4)
DIFFERENTIAL METHOD: ABNORMAL
EOSINOPHIL # BLD AUTO: 0.2 K/UL (ref 0–0.5)
EOSINOPHIL NFR BLD: 1.8 % (ref 0–8)
ERYTHROCYTE [DISTWIDTH] IN BLOOD BY AUTOMATED COUNT: 13.6 % (ref 11.5–14.5)
EST. GFR  (AFRICAN AMERICAN): >60 ML/MIN/1.73 M^2
EST. GFR  (NON AFRICAN AMERICAN): >60 ML/MIN/1.73 M^2
GLUCOSE SERPL-MCNC: 131 MG/DL (ref 70–110)
HCT VFR BLD AUTO: 42.3 % (ref 40–54)
HGB BLD-MCNC: 13.3 G/DL (ref 14–18)
IMM GRANULOCYTES # BLD AUTO: 0.03 K/UL (ref 0–0.04)
IMM GRANULOCYTES NFR BLD AUTO: 0.3 % (ref 0–0.5)
LACTATE SERPL-SCNC: 3 MMOL/L (ref 0.5–2.2)
LYMPHOCYTES # BLD AUTO: 4.9 K/UL (ref 1–4.8)
LYMPHOCYTES NFR BLD: 50.5 % (ref 18–48)
MCH RBC QN AUTO: 30.5 PG (ref 27–31)
MCHC RBC AUTO-ENTMCNC: 31.4 G/DL (ref 32–36)
MCV RBC AUTO: 97 FL (ref 82–98)
MONOCYTES # BLD AUTO: 0.7 K/UL (ref 0.3–1)
MONOCYTES NFR BLD: 7.1 % (ref 4–15)
NEUTROPHILS # BLD AUTO: 3.9 K/UL (ref 1.8–7.7)
NEUTROPHILS NFR BLD: 39.5 % (ref 38–73)
NRBC BLD-RTO: 0 /100 WBC
PLATELET # BLD AUTO: 232 K/UL (ref 150–350)
PMV BLD AUTO: 10.8 FL (ref 9.2–12.9)
POTASSIUM SERPL-SCNC: 3.3 MMOL/L (ref 3.5–5.1)
PROT SERPL-MCNC: 8.1 G/DL (ref 6–8.4)
RBC # BLD AUTO: 4.36 M/UL (ref 4.6–6.2)
SALICYLATES SERPL-MCNC: <5 MG/DL (ref 15–30)
SARS-COV-2 RDRP RESP QL NAA+PROBE: NEGATIVE
SODIUM SERPL-SCNC: 142 MMOL/L (ref 136–145)
TROPONIN I SERPL-MCNC: <0.012 NG/ML (ref 0.01–0.03)
UUN UR-MCNC: 11 MG/DL (ref 2–20)
WBC # BLD AUTO: 9.76 K/UL (ref 3.9–12.7)

## 2021-03-27 PROCEDURE — 84484 ASSAY OF TROPONIN QUANT: CPT | Mod: ER | Performed by: EMERGENCY MEDICINE

## 2021-03-27 PROCEDURE — 99285 EMERGENCY DEPT VISIT HI MDM: CPT | Mod: 25,ER

## 2021-03-27 PROCEDURE — 25000003 PHARM REV CODE 250: Mod: ER | Performed by: EMERGENCY MEDICINE

## 2021-03-27 PROCEDURE — 80143 DRUG ASSAY ACETAMINOPHEN: CPT | Mod: ER | Performed by: EMERGENCY MEDICINE

## 2021-03-27 PROCEDURE — 93010 EKG 12-LEAD: ICD-10-PCS | Mod: ,,, | Performed by: INTERNAL MEDICINE

## 2021-03-27 PROCEDURE — 96375 TX/PRO/DX INJ NEW DRUG ADDON: CPT | Mod: ER

## 2021-03-27 PROCEDURE — 99291 CRITICAL CARE FIRST HOUR: CPT | Mod: 25,ER

## 2021-03-27 PROCEDURE — 96361 HYDRATE IV INFUSION ADD-ON: CPT | Mod: ER

## 2021-03-27 PROCEDURE — 80179 DRUG ASSAY SALICYLATE: CPT | Mod: ER | Performed by: EMERGENCY MEDICINE

## 2021-03-27 PROCEDURE — 83605 ASSAY OF LACTIC ACID: CPT | Mod: ER | Performed by: EMERGENCY MEDICINE

## 2021-03-27 PROCEDURE — 96374 THER/PROPH/DIAG INJ IV PUSH: CPT | Mod: ER

## 2021-03-27 PROCEDURE — 93010 ELECTROCARDIOGRAM REPORT: CPT | Mod: ,,, | Performed by: INTERNAL MEDICINE

## 2021-03-27 PROCEDURE — 93005 ELECTROCARDIOGRAM TRACING: CPT | Mod: ER

## 2021-03-27 PROCEDURE — G0378 HOSPITAL OBSERVATION PER HR: HCPCS

## 2021-03-27 PROCEDURE — 80053 COMPREHEN METABOLIC PANEL: CPT | Mod: ER | Performed by: EMERGENCY MEDICINE

## 2021-03-27 PROCEDURE — 63600175 PHARM REV CODE 636 W HCPCS: Mod: ER | Performed by: EMERGENCY MEDICINE

## 2021-03-27 PROCEDURE — 99292 CRITICAL CARE ADDL 30 MIN: CPT | Mod: ER

## 2021-03-27 PROCEDURE — U0002 COVID-19 LAB TEST NON-CDC: HCPCS | Mod: ER | Performed by: EMERGENCY MEDICINE

## 2021-03-27 PROCEDURE — 82550 ASSAY OF CK (CPK): CPT | Mod: ER | Performed by: EMERGENCY MEDICINE

## 2021-03-27 PROCEDURE — 27000221 HC OXYGEN, UP TO 24 HOURS: Mod: ER

## 2021-03-27 PROCEDURE — 25000003 PHARM REV CODE 250: Performed by: STUDENT IN AN ORGANIZED HEALTH CARE EDUCATION/TRAINING PROGRAM

## 2021-03-27 PROCEDURE — 85025 COMPLETE CBC W/AUTO DIFF WBC: CPT | Mod: ER | Performed by: EMERGENCY MEDICINE

## 2021-03-27 RX ORDER — SODIUM CHLORIDE 0.9 % (FLUSH) 0.9 %
10 SYRINGE (ML) INJECTION
Status: DISCONTINUED | OUTPATIENT
Start: 2021-03-27 | End: 2021-03-28 | Stop reason: HOSPADM

## 2021-03-27 RX ORDER — ONDANSETRON 2 MG/ML
8 INJECTION INTRAMUSCULAR; INTRAVENOUS
Status: COMPLETED | OUTPATIENT
Start: 2021-03-27 | End: 2021-03-27

## 2021-03-27 RX ORDER — NALOXONE HYDROCHLORIDE 1 MG/ML
2 INJECTION INTRAMUSCULAR; INTRAVENOUS; SUBCUTANEOUS
Status: COMPLETED | OUTPATIENT
Start: 2021-03-27 | End: 2021-03-27

## 2021-03-27 RX ORDER — ACETAMINOPHEN 325 MG/1
650 TABLET ORAL EVERY 6 HOURS PRN
Status: DISCONTINUED | OUTPATIENT
Start: 2021-03-27 | End: 2021-03-28 | Stop reason: HOSPADM

## 2021-03-27 RX ORDER — TALC
6 POWDER (GRAM) TOPICAL NIGHTLY PRN
Status: DISCONTINUED | OUTPATIENT
Start: 2021-03-27 | End: 2021-03-28 | Stop reason: HOSPADM

## 2021-03-27 RX ORDER — SODIUM CHLORIDE 9 MG/ML
INJECTION, SOLUTION INTRAVENOUS
Status: COMPLETED | OUTPATIENT
Start: 2021-03-27 | End: 2021-03-27

## 2021-03-27 RX ADMIN — SODIUM CHLORIDE: 0.9 INJECTION, SOLUTION INTRAVENOUS at 02:03

## 2021-03-27 RX ADMIN — NALOXONE HYDROCHLORIDE 2 MG: 1 INJECTION PARENTERAL at 02:03

## 2021-03-27 RX ADMIN — ACETAMINOPHEN 650 MG: 325 TABLET ORAL at 09:03

## 2021-03-27 RX ADMIN — ONDANSETRON 8 MG: 2 INJECTION INTRAMUSCULAR; INTRAVENOUS at 03:03

## 2021-03-28 VITALS
BODY MASS INDEX: 21 KG/M2 | OXYGEN SATURATION: 97 % | HEIGHT: 71 IN | TEMPERATURE: 99 F | WEIGHT: 150 LBS | RESPIRATION RATE: 20 BRPM | HEART RATE: 63 BPM | DIASTOLIC BLOOD PRESSURE: 62 MMHG | SYSTOLIC BLOOD PRESSURE: 110 MMHG

## 2021-03-28 LAB
AMPHET+METHAMPHET UR QL: NEGATIVE
AMPHET+METHAMPHET UR QL: NEGATIVE
BARBITURATES UR QL SCN>200 NG/ML: NEGATIVE
BARBITURATES UR QL SCN>200 NG/ML: NEGATIVE
BENZODIAZ UR QL SCN>200 NG/ML: NEGATIVE
BENZODIAZ UR QL SCN>200 NG/ML: NEGATIVE
BZE UR QL SCN: NEGATIVE
BZE UR QL SCN: NEGATIVE
CANNABINOIDS UR QL SCN: NORMAL
CANNABINOIDS UR QL SCN: NORMAL
CREAT UR-MCNC: 109 MG/DL (ref 23–375)
CREAT UR-MCNC: 109 MG/DL (ref 23–375)
ETHANOL UR-MCNC: <10 MG/DL
METHADONE UR QL SCN>300 NG/ML: NEGATIVE
METHADONE UR QL SCN>300 NG/ML: NEGATIVE
OPIATES UR QL SCN: NEGATIVE
OPIATES UR QL SCN: NEGATIVE
PCP UR QL SCN>25 NG/ML: NEGATIVE
PCP UR QL SCN>25 NG/ML: NEGATIVE
TOXICOLOGY INFORMATION: NORMAL
TOXICOLOGY INFORMATION: NORMAL

## 2021-03-28 PROCEDURE — 94761 N-INVAS EAR/PLS OXIMETRY MLT: CPT

## 2021-03-28 PROCEDURE — G0378 HOSPITAL OBSERVATION PER HR: HCPCS

## 2021-03-28 PROCEDURE — 99900035 HC TECH TIME PER 15 MIN (STAT)

## 2021-03-28 PROCEDURE — 27000221 HC OXYGEN, UP TO 24 HOURS

## 2021-03-28 PROCEDURE — 80307 DRUG TEST PRSMV CHEM ANLYZR: CPT | Performed by: STUDENT IN AN ORGANIZED HEALTH CARE EDUCATION/TRAINING PROGRAM

## 2021-03-28 RX ORDER — ONDANSETRON 8 MG/1
8 TABLET, ORALLY DISINTEGRATING ORAL EVERY 6 HOURS PRN
Status: DISCONTINUED | OUTPATIENT
Start: 2021-03-28 | End: 2021-03-28 | Stop reason: HOSPADM

## 2021-06-07 PROBLEM — Z13.9 ENCOUNTER FOR MEDICAL SCREENING EXAMINATION: Status: RESOLVED | Noted: 2021-03-02 | Resolved: 2021-06-07

## 2021-07-01 ENCOUNTER — PATIENT MESSAGE (OUTPATIENT)
Dept: ADMINISTRATIVE | Facility: OTHER | Age: 31
End: 2021-07-01

## 2022-01-23 PROBLEM — Z00.8 MEDICAL CLEARANCE FOR PSYCHIATRIC ADMISSION: Status: ACTIVE | Noted: 2021-03-02

## 2022-01-25 PROBLEM — Z87.820 HISTORY OF TRAUMATIC BRAIN INJURY: Status: ACTIVE | Noted: 2020-12-27

## 2022-01-25 PROBLEM — F11.21 OPIOID USE DISORDER, MODERATE, IN EARLY REMISSION: Status: ACTIVE | Noted: 2022-01-25

## 2022-01-25 PROBLEM — F17.200 TOBACCO USE DISORDER: Status: ACTIVE | Noted: 2022-01-25

## 2022-04-25 PROBLEM — Z00.8 MEDICAL CLEARANCE FOR PSYCHIATRIC ADMISSION: Status: RESOLVED | Noted: 2021-03-02 | Resolved: 2022-04-25
